# Patient Record
Sex: MALE | Race: WHITE | NOT HISPANIC OR LATINO | Employment: OTHER | URBAN - METROPOLITAN AREA
[De-identification: names, ages, dates, MRNs, and addresses within clinical notes are randomized per-mention and may not be internally consistent; named-entity substitution may affect disease eponyms.]

---

## 2022-01-14 DIAGNOSIS — Z12.11 ENCOUNTER FOR SCREENING COLONOSCOPY: Primary | ICD-10-CM

## 2022-05-18 DIAGNOSIS — Z12.11 SCREENING FOR COLON CANCER: Primary | ICD-10-CM

## 2022-05-19 ENCOUNTER — TELEPHONE (OUTPATIENT)
Dept: GASTROENTEROLOGY | Facility: CLINIC | Age: 72
End: 2022-05-19
Payer: COMMERCIAL

## 2022-05-19 NOTE — TELEPHONE ENCOUNTER
----- Message from Estefania Kelly sent at 5/16/2022  1:16 PM CDT -----  Regarding: DR INFANTE  COLONOSCOPY  Patient calling to schedule colonoscopy. Call back number [phone] (home)

## 2022-05-19 NOTE — TELEPHONE ENCOUNTER
Called patient he is currently unavailable to update information at this time he will call back-BETSY

## 2022-05-27 ENCOUNTER — TELEPHONE (OUTPATIENT)
Dept: GASTROENTEROLOGY | Facility: CLINIC | Age: 72
End: 2022-05-27
Payer: COMMERCIAL

## 2022-05-27 VITALS — BODY MASS INDEX: 32.2 KG/M2 | HEIGHT: 71 IN | WEIGHT: 230 LBS

## 2022-05-27 RX ORDER — CLOPIDOGREL BISULFATE 75 MG/1
75 TABLET ORAL DAILY
COMMUNITY

## 2022-05-27 RX ORDER — DULOXETIN HYDROCHLORIDE 60 MG/1
60 CAPSULE, DELAYED RELEASE ORAL DAILY
COMMUNITY

## 2022-05-27 RX ORDER — TEMAZEPAM 30 MG/1
30 CAPSULE ORAL NIGHTLY PRN
COMMUNITY

## 2022-05-27 RX ORDER — FUROSEMIDE 20 MG/1
20 TABLET ORAL 4 TIMES DAILY
COMMUNITY

## 2022-05-27 RX ORDER — POTASSIUM CHLORIDE 750 MG/1
10 CAPSULE, EXTENDED RELEASE ORAL ONCE
COMMUNITY
End: 2023-03-29

## 2022-05-27 RX ORDER — TRAZODONE HYDROCHLORIDE 50 MG/1
50 TABLET ORAL NIGHTLY
COMMUNITY

## 2022-05-27 RX ORDER — GABAPENTIN 300 MG/1
300 CAPSULE ORAL DAILY
COMMUNITY

## 2022-05-27 RX ORDER — PRAVASTATIN SODIUM 80 MG/1
80 TABLET ORAL DAILY
COMMUNITY

## 2022-05-27 RX ORDER — NAPROXEN SODIUM 220 MG/1
81 TABLET, FILM COATED ORAL DAILY
COMMUNITY

## 2022-05-27 RX ORDER — TAMSULOSIN HYDROCHLORIDE 0.4 MG/1
CAPSULE ORAL DAILY
COMMUNITY

## 2022-05-27 RX ORDER — PANTOPRAZOLE SODIUM 40 MG/1
40 TABLET, DELAYED RELEASE ORAL DAILY
COMMUNITY

## 2022-05-27 RX ORDER — LOSARTAN POTASSIUM 25 MG/1
25 TABLET ORAL DAILY
COMMUNITY

## 2022-05-27 RX ORDER — NEBIVOLOL 5 MG/1
5 TABLET ORAL DAILY
COMMUNITY

## 2022-05-27 NOTE — TELEPHONE ENCOUNTER
----- Message from Lalitha Doyle MA sent at 5/19/2022 12:45 PM CDT -----    ----- Message -----  From: Alexia Robert  Sent: 5/19/2022  12:44 PM CDT  To: [facility] Staff    Type:  Patient Returning Call    Who Called:Jonathan Torres    Who Left Message for Patient: Dianna   Does the patient know what this is regarding?:-  Would the patient rather a call back or a response via [facility]?    Best Call Back Number: [phone]    Additional Information: returning your call

## 2022-05-27 NOTE — TELEPHONE ENCOUNTER
While updating chart patient states he is on Plavix. Scheduled the next available OV with Thalia for us to get clearance to hold before procedure-BETSY

## 2022-07-27 NOTE — PROGRESS NOTES
Clinic Note    Reason for visit:  The primary encounter diagnosis was Esophageal dysphagia. Diagnoses of Gastroesophageal reflux disease, unspecified whether esophagitis present, History of colon polyps, Screening for colon cancer, Long term (current) use of antithrombotics/antiplatelets, and Obesity, Class I, BMI 30.0-34.9 (see actual BMI) were also pertinent to this visit.    PCP: Donaldo Bright   [address] / [place] [address]    HPI:  This is a 71 y.o. male who is here to establish care. Patient w/ GERD, on panto 40 mg daily and states 100% controlled. If misses dose, reflux returns after a day or two. He reports intermittent dysphagia with solids x years. Has to drink liquids to get it down. Has had prior EGD many years ago. Patient reports intermittent diarrhea, about once per week. Otherwise, has BM 1-2 per day. Patient stays active and can climb 2 flights of stairs without difficulty or SOB.     Patient has had CABG x 2 3/2014 with aortic valve replacement (porcine), and multiple coronary and leg stents, last one 3/2020. On Plavix. Cardiologist is Dr. Mickey Henao at [facility] and he will see him next week.     Mickey Henao MD    [address]    [address]    [place] [address]    [phone] (Work)    [phone] (Fax)    Last colonoscopy with FXB 9/9/2014: serrated polyp      Review of Systems   Constitutional: Negative for chills, diaphoresis, fatigue, fever and unexpected weight change.   HENT: Positive for postnasal drip. Negative for hearing loss, mouth sores, nosebleeds, sore throat, trouble swallowing and voice change.    Eyes: Positive for eye dryness. Negative for pain and discharge.   Respiratory: Negative for apnea, cough, choking, chest tightness, shortness of breath and wheezing.    Cardiovascular: Positive for claudication. Negative for chest pain, palpitations and leg swelling.   Gastrointestinal: Negative for abdominal distention, abdominal pain, anal  bleeding, blood in stool, change in bowel habit, constipation, diarrhea, nausea, rectal pain, vomiting, reflux, fecal incontinence and change in bowel habit.   Genitourinary: Negative for bladder incontinence, difficulty urinating, dysuria, flank pain, frequency and hematuria.   Musculoskeletal: Positive for back pain. Negative for arthralgias, joint swelling and joint deformity.   Integumentary:  Negative for color change, rash and wound.   Allergic/Immunologic: Positive for environmental allergies. Negative for food allergies.   Neurological: Negative for seizures, facial asymmetry, speech difficulty, weakness, headaches and memory loss.   Hematological: Negative for adenopathy. Bruises/bleeds easily.   Psychiatric/Behavioral: Positive for sleep disturbance. Negative for agitation, behavioral problems, confusion and hallucinations.      Past Medical History:   Diagnosis Date    Coronary artery disease     Dyslipidemia     GERD (gastroesophageal reflux disease)     HTN (hypertension)     Obesity, Class I, BMI 30.0-34.9 (see actual BMI)     Peripheral vascular disease, unspecified     Sleep apnea, unspecified      Past Surgical History:   Procedure Laterality Date    AORTIC VALVE REPLACEMENT      3/2014, porcine valve    COLONOSCOPY  09/09/2014    CORONARY ANGIOPLASTY WITH STENT PLACEMENT      x12, LAD 1/2009    CORONARY ARTERY BYPASS GRAFT      x2, 3/2014    INSERTION OF STENT INTO PERIPHERAL VESSEL      x4, 11/2015, 2/2020     History reviewed. No pertinent family history.     Review of patient's allergies indicates:  No Known Allergies   Medication List with Changes/Refills   New Medications    SOD SULF-POT CHLORIDE-MAG SULF (SUTAB) 1.479-0.188- 0.225 GRAM TABLET    Take 12 tablets by mouth once daily. Take according to package instructions with indicated amount of water. No breakfast day before test. May substitute with Suprep, Clenpiq, Plenvu, Moviprep or GoLytely based on Rx plan and patient  preference.   Current Medications    ASPIRIN 81 MG CHEW    Take 81 mg by mouth once daily.    CLOPIDOGREL (PLAVIX) 75 MG TABLET    Take 75 mg by mouth once daily.    DULOXETINE (CYMBALTA) 60 MG CAPSULE    Take 60 mg by mouth once daily.    FUROSEMIDE (LASIX) 20 MG TABLET    Take 20 mg by mouth 4 (four) times daily.    GABAPENTIN (NEURONTIN) 300 MG CAPSULE    Take 300 mg by mouth once daily.    LOSARTAN (COZAAR) 25 MG TABLET    Take 25 mg by mouth once daily.    NEBIVOLOL (BYSTOLIC) 5 MG TAB    Take 5 mg by mouth once daily.    PANTOPRAZOLE (PROTONIX) 40 MG TABLET    Take 40 mg by mouth once daily.    POTASSIUM CHLORIDE (MICRO-K) 10 MEQ CPSR    Take 10 mEq by mouth once.    PRAVASTATIN (PRAVACHOL) 80 MG TABLET    Take 80 mg by mouth once daily.    TAMSULOSIN (FLOMAX) 0.4 MG CAP    Take by mouth once daily.    TEMAZEPAM (RESTORIL) 30 MG CAPSULE    Take 30 mg by mouth nightly as needed.    TRAZODONE (DESYREL) 50 MG TABLET    Take 50 mg by mouth every evening.         Vital Signs:  BP 99/61 (BP Location: Left arm, Patient Position: Sitting, BP Method: Large (Automatic))   Pulse 62   Resp 16   Wt 103 kg (227 lb)   SpO2 98%   BMI 31.66 kg/m²        Physical Exam  Constitutional:       General: He is not in acute distress.     Appearance: Normal appearance. He is well-developed. He is not ill-appearing or toxic-appearing.   HENT:      Head: Normocephalic and atraumatic.      Nose: Nose normal.      Mouth/Throat:      Mouth: Mucous membranes are moist.      Pharynx: Oropharynx is clear. No oropharyngeal exudate or posterior oropharyngeal erythema.   Eyes:      General: Lids are normal. No scleral icterus.        Right eye: No discharge.         Left eye: No discharge.      Extraocular Movements: Extraocular movements intact.      Conjunctiva/sclera: Conjunctivae normal.   Cardiovascular:      Rate and Rhythm: Normal rate and regular rhythm.      Pulses:           Radial pulses are 2+ on the right side and 2+ on the  left side.   Pulmonary:      Effort: Pulmonary effort is normal. No respiratory distress.      Breath sounds: No stridor. No wheezing or rhonchi.   Abdominal:      General: Bowel sounds are normal. There is no distension.      Palpations: Abdomen is soft. There is no fluid wave, hepatomegaly, splenomegaly or mass.      Tenderness: There is no abdominal tenderness. There is no guarding or rebound.   Musculoskeletal:      Cervical back: Full passive range of motion without pain.      Right lower leg: No edema.      Left lower leg: No edema.   Lymphadenopathy:      Cervical: No cervical adenopathy.   Skin:     General: Skin is warm and dry.      Capillary Refill: Capillary refill takes less than 2 seconds.      Coloration: Skin is not cyanotic, jaundiced or pale.      Findings: No rash.   Neurological:      General: No focal deficit present.      Mental Status: He is alert and oriented to person, place, and time.   Psychiatric:         Mood and Affect: Mood normal.         Behavior: Behavior is cooperative.            All of the data above and below has been reviewed by myself and any further interpretations will be reflected in the assessment and plan.   The data includes review of external notes, and independent interpretation of lab results, procedures, x-rays, and imaging reports.      Assessment:  Esophageal dysphagia  -     Ambulatory Referral to External Surgery    Gastroesophageal reflux disease, unspecified whether esophagitis present  -     Ambulatory Referral to External Surgery    History of colon polyps  -     Ambulatory referral/consult to Gastroenterology  -     Ambulatory Referral to External Surgery    Screening for colon cancer    Long term (current) use of antithrombotics/antiplatelets    Obesity, Class I, BMI 30.0-34.9 (see actual BMI)    Other orders  -     sod sulf-pot chloride-mag sulf (SUTAB) 1.479-0.188- 0.225 gram tablet; Take 12 tablets by mouth once daily. Take according to package  instructions with indicated amount of water. No breakfast day before test. May substitute with Suprep, Clenpiq, Plenvu, Moviprep or GoLytely based on Rx plan and patient preference.  Dispense: 24 tablet; Refill: 0    Plan for esophageal biopsies to rule out EE with possible dilation. GERD controlled with panto 40 daily.      Recommendations:  Schedule upper and lower endoscopies with Dr. Butler. Plan to hold Plavix 5 days prior to procedure.     Risks, benefits, and alternatives of medical management, any associated procedures, and/or treatment discussed with the patient. Patient given opportunity to ask questions and voices understanding. Patient has elected to proceed with the recommended care modalities as discussed.    Follow up if symptoms worsen or fail to improve.    Order summary:  Orders Placed This Encounter   Procedures    Ambulatory Referral to External Surgery        Instructed patient to notify my office if they have not been contacted within two weeks after any procedures, submitting any samples (biopsies, blood, stool, urine, etc.) or after any imaging (X-ray, CT, MRI, etc.).      Thalia Jackson NP    This document may have been created using a voice recognition transcribing system. Incorrect words or phrases may have been missed during proofreading. Please interpret accordingly or contact me for clarification.

## 2022-07-28 ENCOUNTER — OFFICE VISIT (OUTPATIENT)
Dept: GASTROENTEROLOGY | Facility: CLINIC | Age: 72
End: 2022-07-28
Payer: COMMERCIAL

## 2022-07-28 VITALS
WEIGHT: 227 LBS | OXYGEN SATURATION: 98 % | DIASTOLIC BLOOD PRESSURE: 61 MMHG | SYSTOLIC BLOOD PRESSURE: 99 MMHG | HEART RATE: 62 BPM | BODY MASS INDEX: 31.66 KG/M2 | RESPIRATION RATE: 16 BRPM

## 2022-07-28 DIAGNOSIS — E66.9 OBESITY, CLASS I, BMI 30.0-34.9 (SEE ACTUAL BMI): ICD-10-CM

## 2022-07-28 DIAGNOSIS — K21.9 GASTROESOPHAGEAL REFLUX DISEASE, UNSPECIFIED WHETHER ESOPHAGITIS PRESENT: ICD-10-CM

## 2022-07-28 DIAGNOSIS — R13.19 ESOPHAGEAL DYSPHAGIA: Primary | ICD-10-CM

## 2022-07-28 DIAGNOSIS — Z12.11 SCREENING FOR COLON CANCER: ICD-10-CM

## 2022-07-28 DIAGNOSIS — Z79.02 LONG TERM (CURRENT) USE OF ANTITHROMBOTICS/ANTIPLATELETS: ICD-10-CM

## 2022-07-28 DIAGNOSIS — Z86.010 HISTORY OF COLON POLYPS: ICD-10-CM

## 2022-07-28 PROCEDURE — 99203 OFFICE O/P NEW LOW 30 MIN: CPT | Mod: S$GLB,,,

## 2022-07-28 PROCEDURE — 99203 PR OFFICE/OUTPT VISIT, NEW, LEVL III, 30-44 MIN: ICD-10-PCS | Mod: S$GLB,,,

## 2022-07-28 RX ORDER — SOD SULF/POT CHLORIDE/MAG SULF 1.479 G
12 TABLET ORAL DAILY
Qty: 24 TABLET | Refills: 0 | Status: SHIPPED | OUTPATIENT
Start: 2022-07-28 | End: 2023-03-29

## 2022-07-28 NOTE — LETTER
July 28, 2022    Mickey Henao MD    [address]    [address]    [place] [address]    [phone] (Work)    [phone] (Fax)                 [facility]  [address] [address]  Phone: [phone]  Fax: [phone]   Patient: Jonathan Torres   MR Number: [id number]   YOB: 1950   Date of Visit: 7/28/2022     Dear Dr. Henao,     I am writing to you for some assistance with a patient that is currently under your care. Jonathan Torres  1950  has seen Thalia Jackson NP and needs to be scheduled for an EGD/Colonoscopy with Dr. Butler. Ideally, the patient would hold the Plavix for 5 days in the event that interventions are needed (dilation, polypectomy, biopsies, etc.). Since Thalia Jackson NP is not managing this medication, she would like your assistance in approving the above request. Please fax response to [phone]. Your response is greatly appreciated and will assist us in providing optimal patient care. Should you have any questions or concerns, please do not hesitate to contact me at [phone].            Sincerely,      Thalia Jackson NP            CC    No Recipients    Enclosure

## 2022-07-28 NOTE — PATIENT INSTRUCTIONS
Schedule upper and lower endoscopies with Dr. Butler. Plan to hold Plavix 5 days prior to procedure.     Please notify my office if you have not been contacted within two weeks after any procedures, submitting any samples (biopsies, blood, stool, urine, etc.) or after any imaging (X-ray, CT, MRI, etc.).

## 2022-07-28 NOTE — LETTER
July 28, 2022      Mickey Henao MD    [address]    [address]    [place] [address]    [phone] (Work)    [phone] (Fax)             [facility]  [address] [address]  Phone: [phone]  Fax: [phone]   Patient: Jonathan Torres   MR Number: [id number]   YOB: 1950   Date of Visit: 7/28/2022     Dear Dr. Henao,     I am writing to you for some assistance with a patient that is currently under your care. Jonathan Torres  1950  has seen Thalia Jackson NP and needs to be scheduled for a colonoscopy with Dr. Butler. Ideally, the patient would hold the Plavix for 5 days in the event that interventions are needed (dilation, polypectomy, biopsies, etc.). Since Thalia Jackson NP is not managing this medication, she would like your assistance in approving the above request. Please fax response to [phone]. Your response is greatly appreciated and will assist us in providing optimal patient care. Should you have any questions or concerns, please do not hesitate to contact me at [phone].            Sincerely,      Thalia Jackson NP            CC  No Recipients    Enclosure

## 2022-08-25 ENCOUNTER — OUTSIDE PLACE OF SERVICE (OUTPATIENT)
Dept: GASTROENTEROLOGY | Facility: CLINIC | Age: 72
End: 2022-08-25
Payer: COMMERCIAL

## 2022-08-25 LAB — EGD FOLLOW UP EXTERNAL: NORMAL

## 2022-08-25 PROCEDURE — 43239 PR EGD, FLEX, W/BIOPSY, SGL/MULTI: ICD-10-PCS | Mod: 51,,, | Performed by: INTERNAL MEDICINE

## 2022-08-25 PROCEDURE — 45385 PR COLONOSCOPY,REMV LESN,SNARE: ICD-10-PCS | Mod: 33,,, | Performed by: INTERNAL MEDICINE

## 2022-08-25 PROCEDURE — 45380 COLONOSCOPY AND BIOPSY: CPT | Mod: 59,,, | Performed by: INTERNAL MEDICINE

## 2022-08-25 PROCEDURE — 45380 PR COLONOSCOPY,BIOPSY: ICD-10-PCS | Mod: 59,,, | Performed by: INTERNAL MEDICINE

## 2022-08-25 PROCEDURE — 45385 COLONOSCOPY W/LESION REMOVAL: CPT | Mod: 33,,, | Performed by: INTERNAL MEDICINE

## 2022-08-25 PROCEDURE — 43239 EGD BIOPSY SINGLE/MULTIPLE: CPT | Mod: 51,,, | Performed by: INTERNAL MEDICINE

## 2022-08-29 ENCOUNTER — TELEPHONE (OUTPATIENT)
Dept: GASTROENTEROLOGY | Facility: CLINIC | Age: 72
End: 2022-08-29
Payer: COMMERCIAL

## 2022-08-29 DIAGNOSIS — K21.9 GASTROESOPHAGEAL REFLUX DISEASE, UNSPECIFIED WHETHER ESOPHAGITIS PRESENT: ICD-10-CM

## 2022-08-29 DIAGNOSIS — R13.19 ESOPHAGEAL DYSPHAGIA: Primary | ICD-10-CM

## 2022-08-29 NOTE — TELEPHONE ENCOUNTER
GBx react w/o Hp, EBx reflux, 4 TA, 1 hyp, repeat colonoscopy in 3 years.   Notify patient. No signs of infection or precancerous cells on the upper endoscopy biopsies.  Update in Health Maintenance section of Epic. Schedule EM given his swallowing Sx. He will need to sign consent form.  NBP

## 2022-09-07 ENCOUNTER — OUTSIDE PLACE OF SERVICE (OUTPATIENT)
Dept: GASTROENTEROLOGY | Facility: CLINIC | Age: 72
End: 2022-09-07
Payer: COMMERCIAL

## 2022-09-07 PROCEDURE — 91010 ESOPHAGUS MOTILITY STUDY: CPT | Mod: 26,,, | Performed by: INTERNAL MEDICINE

## 2022-09-07 PROCEDURE — 91037 ESOPH IMPED FUNCTION TEST: CPT | Mod: 26,,, | Performed by: INTERNAL MEDICINE

## 2022-09-07 PROCEDURE — 91037 PR GERD TST W/ NASAL IMPEDENCE ELECTROD: ICD-10-PCS | Mod: 26,,, | Performed by: INTERNAL MEDICINE

## 2022-09-07 PROCEDURE — 91010 PR ESOPHAGEAL MOTILITY STUDY, MA2METRY: ICD-10-PCS | Mod: 26,,, | Performed by: INTERNAL MEDICINE

## 2022-09-08 ENCOUNTER — TELEPHONE (OUTPATIENT)
Dept: GASTROENTEROLOGY | Facility: CLINIC | Age: 72
End: 2022-09-08
Payer: COMMERCIAL

## 2022-09-08 NOTE — TELEPHONE ENCOUNTER
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  It is something similar to a spasm the esophagus.  There were no tumors.  No signs of achalasia.  His muscles of the esophagus are working in good coordination.  The antacid pantoprazole can often help with this.  Some of the other medications used to minimize the symptoms from hypercontractile esophagus in effect blood pressure therefore I would hope to avoid them as long as his swallowing symptoms do not affect his nutrition or quality of life.  Please make a backup follow-up office visit with me around 6 months in contact me sooner if any issues.  NBP

## 2023-03-28 ENCOUNTER — DOCUMENTATION ONLY (OUTPATIENT)
Dept: GASTROENTEROLOGY | Facility: CLINIC | Age: 73
End: 2023-03-28
Payer: MEDICARE

## 2023-03-29 ENCOUNTER — OFFICE VISIT (OUTPATIENT)
Dept: GASTROENTEROLOGY | Facility: CLINIC | Age: 73
End: 2023-03-29
Payer: MEDICARE

## 2023-03-29 VITALS
OXYGEN SATURATION: 98 % | HEIGHT: 71 IN | DIASTOLIC BLOOD PRESSURE: 69 MMHG | HEART RATE: 60 BPM | BODY MASS INDEX: 32.9 KG/M2 | SYSTOLIC BLOOD PRESSURE: 122 MMHG | WEIGHT: 235 LBS

## 2023-03-29 DIAGNOSIS — Z86.010 HISTORY OF COLON POLYPS: ICD-10-CM

## 2023-03-29 DIAGNOSIS — K21.9 GASTROESOPHAGEAL REFLUX DISEASE, UNSPECIFIED WHETHER ESOPHAGITIS PRESENT: ICD-10-CM

## 2023-03-29 DIAGNOSIS — K22.4 HYPERCONTRACTILE ESOPHAGUS: Primary | ICD-10-CM

## 2023-03-29 DIAGNOSIS — R13.19 ESOPHAGEAL DYSPHAGIA: ICD-10-CM

## 2023-03-29 DIAGNOSIS — E66.9 OBESITY, CLASS I, BMI 30.0-34.9 (SEE ACTUAL BMI): ICD-10-CM

## 2023-03-29 PROCEDURE — 99214 PR OFFICE/OUTPT VISIT, EST, LEVL IV, 30-39 MIN: ICD-10-PCS | Mod: S$GLB,,, | Performed by: INTERNAL MEDICINE

## 2023-03-29 PROCEDURE — 99214 OFFICE O/P EST MOD 30 MIN: CPT | Mod: S$GLB,,, | Performed by: INTERNAL MEDICINE

## 2023-03-29 RX ORDER — POTASSIUM CHLORIDE 750 MG/1
TABLET, EXTENDED RELEASE ORAL
COMMUNITY
Start: 2023-02-13

## 2023-03-29 RX ORDER — EZETIMIBE 10 MG/1
TABLET ORAL
COMMUNITY
Start: 2022-12-12

## 2023-03-29 RX ORDER — TORSEMIDE 20 MG/1
TABLET ORAL
COMMUNITY

## 2023-03-29 RX ORDER — ALLOPURINOL 100 MG/1
TABLET ORAL
COMMUNITY
Start: 2023-02-11

## 2023-03-29 NOTE — PROGRESS NOTES
Clinic Note    Reason for visit:  The primary encounter diagnosis was Hypercontractile esophagus. Diagnoses of Gastroesophageal reflux disease, unspecified whether esophagitis present, Esophageal dysphagia, History of colon polyps, and Obesity, Class I, BMI 30.0-34.9 (see actual BMI) were also pertinent to this visit.    PCP: Donaldo Bright       HPI:  This is a 72 y.o. male who is here for a follow up. Patient states swallowing is okay. Still occasionally has dysphagia with solids. Drinks liquids while eating and that helps. Taking panto 40 daily.   Gained 8 # since our last OV.    EM 9/7/2022: hypercontractile esophagus.    EGD/Colonoscopy 8/25/2022: GBx react w/o Hp, EBx reflux, 4 TA, 1 hyp, repeat colonoscopy in 3 years.    Review of Systems   Constitutional:  Negative for chills, diaphoresis, fatigue, fever and unexpected weight change.   HENT:  Negative for hearing loss, mouth sores, nosebleeds, postnasal drip, sore throat, trouble swallowing and voice change.    Eyes:  Negative for pain, discharge and eye dryness.   Respiratory:  Negative for apnea, cough, choking, chest tightness, shortness of breath and wheezing.    Cardiovascular:  Negative for chest pain, palpitations, leg swelling and claudication.   Gastrointestinal:  Negative for abdominal distention, abdominal pain, anal bleeding, blood in stool, change in bowel habit, constipation, diarrhea, nausea, rectal pain, vomiting, reflux, fecal incontinence and change in bowel habit.   Genitourinary:  Negative for bladder incontinence, difficulty urinating, dysuria, flank pain, frequency and hematuria.   Musculoskeletal:  Negative for arthralgias, back pain, joint swelling and joint deformity.   Integumentary:  Negative for color change, rash and wound.   Allergic/Immunologic: Negative for environmental allergies and food allergies.   Neurological:  Negative for seizures, facial asymmetry, speech difficulty, weakness, headaches and memory loss.    Hematological:  Negative for adenopathy. Does not bruise/bleed easily.   Psychiatric/Behavioral:  Negative for agitation, behavioral problems, confusion, hallucinations and sleep disturbance.       Past Medical History:   Diagnosis Date    Coronary artery disease     Dyslipidemia     GERD (gastroesophageal reflux disease)     HTN (hypertension)     Obesity, Class I, BMI 30.0-34.9 (see actual BMI)     Peripheral vascular disease, unspecified     Personal history of colonic polyps     Sleep apnea, unspecified      Past Surgical History:   Procedure Laterality Date    AORTIC VALVE REPLACEMENT      3/2014, porcine valve    COLONOSCOPY  09/09/2014    CORONARY ANGIOPLASTY WITH STENT PLACEMENT      x12, LAD 1/2009    CORONARY ARTERY BYPASS GRAFT      x2, 3/2014    HERNIA REPAIR      x2    INSERTION OF STENT INTO PERIPHERAL VESSEL      x4, 11/2015, 2/2020     Family History   Problem Relation Age of Onset    Breast cancer Mother     Breast cancer Maternal Grandfather      Social History     Tobacco Use    Smoking status: Former     Types: Cigarettes    Smokeless tobacco: Never   Substance Use Topics    Alcohol use: Yes     Comment: social    Drug use: Never     Review of patient's allergies indicates:  No Known Allergies     Medication List with Changes/Refills   Current Medications    ALLOPURINOL (ZYLOPRIM) 100 MG TABLET        ASPIRIN 81 MG CHEW    Take 81 mg by mouth once daily.    CLOPIDOGREL (PLAVIX) 75 MG TABLET    Take 75 mg by mouth once daily.    DULOXETINE (CYMBALTA) 60 MG CAPSULE    Take 60 mg by mouth once daily.    EZETIMIBE (ZETIA) 10 MG TABLET        FUROSEMIDE (LASIX) 20 MG TABLET    Take 20 mg by mouth 4 (four) times daily.    GABAPENTIN (NEURONTIN) 300 MG CAPSULE    Take 300 mg by mouth once daily.    LOSARTAN (COZAAR) 25 MG TABLET    Take 25 mg by mouth once daily.    NEBIVOLOL (BYSTOLIC) 5 MG TAB    Take 5 mg by mouth once daily.    PANTOPRAZOLE (PROTONIX) 40 MG TABLET    Take 40 mg by mouth once  "daily.    POTASSIUM CHLORIDE SA (K-DUR,KLOR-CON M) 10 MEQ TABLET        PRAVASTATIN (PRAVACHOL) 80 MG TABLET    Take 80 mg by mouth once daily.    TAMSULOSIN (FLOMAX) 0.4 MG CAP    Take by mouth once daily.    TEMAZEPAM (RESTORIL) 30 MG CAPSULE    Take 30 mg by mouth nightly as needed.    TORSEMIDE (DEMADEX) 20 MG TAB    torsemide 20 mg tablet    TRAZODONE (DESYREL) 50 MG TABLET    Take 50 mg by mouth every evening.   Discontinued Medications    POTASSIUM CHLORIDE (MICRO-K) 10 MEQ CPSR    Take 10 mEq by mouth once.    SOD SULF-POT CHLORIDE-MAG SULF (SUTAB) 1.479-0.188- 0.225 GRAM TABLET    Take 12 tablets by mouth once daily. Take according to package instructions with indicated amount of water. No breakfast day before test. May substitute with Suprep, Clenpiq, Plenvu, Moviprep or GoLytely based on Rx plan and patient preference.         Vital Signs:  /69   Pulse 60   Ht 5' 11" (1.803 m)   Wt 106.6 kg (235 lb)   SpO2 98%   BMI 32.78 kg/m²         Physical Exam  Constitutional:       General: He is not in acute distress.     Appearance: Normal appearance. He is well-developed. He is not ill-appearing or toxic-appearing.   HENT:      Head: Normocephalic and atraumatic.      Nose: Nose normal.      Mouth/Throat:      Mouth: Mucous membranes are moist.      Pharynx: Oropharynx is clear. No oropharyngeal exudate or posterior oropharyngeal erythema.   Eyes:      General: Lids are normal. No scleral icterus.        Right eye: No discharge.         Left eye: No discharge.      Extraocular Movements: Extraocular movements intact.      Conjunctiva/sclera: Conjunctivae normal.   Cardiovascular:      Rate and Rhythm: Normal rate and regular rhythm.      Pulses:           Radial pulses are 2+ on the right side and 2+ on the left side.   Pulmonary:      Effort: Pulmonary effort is normal. No respiratory distress.      Breath sounds: No stridor. No wheezing or rhonchi.   Abdominal:      General: Bowel sounds are normal. " There is no distension.      Palpations: Abdomen is soft. There is no fluid wave, hepatomegaly, splenomegaly or mass.      Tenderness: There is no abdominal tenderness. There is no guarding or rebound.   Musculoskeletal:      Cervical back: Full passive range of motion without pain.      Right lower leg: No edema.      Left lower leg: No edema.   Lymphadenopathy:      Cervical: No cervical adenopathy.   Skin:     General: Skin is warm and dry.      Capillary Refill: Capillary refill takes less than 2 seconds.      Coloration: Skin is not cyanotic, jaundiced or pale.      Findings: No rash.   Neurological:      General: No focal deficit present.      Mental Status: He is alert and oriented to person, place, and time.   Psychiatric:         Mood and Affect: Mood normal.         Behavior: Behavior is cooperative.          All of the data above and below has been reviewed by myself and any further interpretations will be reflected in the assessment and plan.   The data includes review of external notes, and independent interpretation of lab results, procedures, x-rays, and imaging reports.      Assessment:  Hypercontractile esophagus    Gastroesophageal reflux disease, unspecified whether esophagitis present    Esophageal dysphagia    History of colon polyps    Obesity, Class I, BMI 30.0-34.9 (see actual BMI)      Will continue with panto 40 daily. Nutrition not affected.   Colonoscopy due 2025.      Recommendations:  Continue pantoprazole 40 mg daily.  Contact me sooner if any unexplained weight loss or other issues.    Risks, benefits, and alternatives of medical management, any associated procedures, and/or treatment discussed with the patient. Patient given opportunity to ask questions and voices understanding. Patient has elected to proceed with the recommended care modalities as discussed.    Follow up in about 1 year (around 3/29/2024).    Order summary:  No orders of the defined types were placed in this  encounter.       Instructed patient to notify my office if they have not been contacted within two weeks after any procedures, submitting any samples (biopsies, blood, stool, urine, etc.) or after any imaging (X-ray, CT, MRI, etc.).     Nhung Butler MD    This document may have been created using a voice recognition transcribing system. Incorrect words or phrases may have been missed during proofreading. Please interpret accordingly or contact me for clarification.

## 2023-03-29 NOTE — PATIENT INSTRUCTIONS
Continue pantoprazole 40 mg daily.  Contact me sooner if any unexplained weight loss or other issues.